# Patient Record
Sex: FEMALE | Race: WHITE | NOT HISPANIC OR LATINO | Employment: OTHER | URBAN - METROPOLITAN AREA
[De-identification: names, ages, dates, MRNs, and addresses within clinical notes are randomized per-mention and may not be internally consistent; named-entity substitution may affect disease eponyms.]

---

## 2023-02-09 ENCOUNTER — TELEPHONE (OUTPATIENT)
Dept: HEMATOLOGY/ONCOLOGY | Facility: CLINIC | Age: 88
End: 2023-02-09
Payer: MEDICARE

## 2023-02-09 NOTE — TELEPHONE ENCOUNTER
No evidence noted of BRCA in chart review.   Called and spoke to Karlene, reported above.       ----- Message from Annamarie Coleman sent at 2/9/2023  9:16 AM CST -----  Consult/Advisory    Name Of Caller: Karlene- daughter      Contact Preference?: [phone]      Nature of Call? Inquiring if pt had the BRACA test so that she and her sister can be tested if so